# Patient Record
(demographics unavailable — no encounter records)

---

## 2024-10-29 NOTE — PHYSICAL EXAM
[Partial] : partial cerumen impaction [Normal] : normal [2+] : 2+ [FreeTextEntry8] : removed [FreeTextEntry9] : removed

## 2024-10-29 NOTE — ASSESSMENT
[FreeTextEntry1] : mother given a list of medicaid HA providers buccal swab kits requested from genetics f/u w genetic testing for family  Hearing Aids FM unit preferential seating hearing specialist in school  f/u 3 months for exam and audio

## 2024-10-29 NOTE — ADDENDUM
[FreeTextEntry1] : 10/29/24 Left message for mother recommending ophtho eval in light of dx of Mark davenport.

## 2024-10-29 NOTE — HISTORY OF PRESENT ILLNESS
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  Further genetic testing of the pt and her family is in progress Hearing aids and an FM unit have bee recommended.

## 2024-10-29 NOTE — HISTORY OF PRESENT ILLNESS
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  Further genetic testing of the pt and her family is in progress Hearing aids and an FM unit have bee recommended.

## 2025-01-13 NOTE — HISTORY OF PRESENT ILLNESS
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  Genetic testing of her parents is in progress aided MATT and has FM unit in school  moe coker is in Feb

## 2025-01-13 NOTE — ASSESSMENT
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  hearing is stable as compared to 9/10/24  f/u w Dr Byrne in 6 months

## 2025-06-09 NOTE — ASSESSMENT
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 Exam shows normal b/l TMs, b/l facial function symmetric.  Audiogram shows stable b/l SNHL.  Ophtho evaluation recommended observation with repeat exam annually.  Continue b/l HAs, f/u 6 months.  F/u with genetics.